# Patient Record
(demographics unavailable — no encounter records)

---

## 2019-01-24 NOTE — EDM.PDOC
ED HPI GENERAL MEDICAL PROBLEM





- General


Stated Complaint: PINK EYE


Time Seen by Provider: 01/24/19 11:20


Source of Information: Reports: Patient, Family


History Limitations: Reports: No Limitations





- History of Present Illness


INITIAL COMMENTS - FREE TEXT/NARRATIVE: 


PEDS HISTORY AND PHYSICAL:





History of present illness:


Patient is a 6-year-old male who is brought to the emergency room by his father 

with concerns of conjunctivitis to the right eye. He states that this morning 

he was fine but was asked to pick him up from school as the right eye had 

redness and some purulent drainage. Father states that the child has had a 

persistent cough  x 1 month and is requesting antibiotics. Denies any fever, 

chills, chest pain or shortness of breath. Denies any abdominal pain, nausea, 

vomiting, diarrhea or constipation. He has been eating and drinking 

appropriately. Childhood immunizations are up to date.





Review of systems: 


As per history of present illness and below otherwise all systems reviewed and 

negative.





Past medical history: 


As per history of present illness and as reviewed below otherwise 

noncontributory.





Surgical history: 


As per history of present illness and as reviewed below otherwise 

noncontributory.





Social history: 


No reported history of drug or alcohol abuse.





Family history: 


As per history of present illness and as reviewed below otherwise 

noncontributory.





Physical exam:


General: Well-developed and well-nourished 6-year-old -American male. 

Alert and appropriate for age. Nontoxic appearing and in no acute distress.


HEENT: Atraumatic, normocephalic, pupils reactive, negative for conjunctival 

pallor or scleral icterus, scleral injection of the right eye with watery 

discharge. His mucous membranes moist, throat clear, neck supple, nontender, 

trachea midline.  TMs normal bilaterally, no cervical adenopathy or nuchal 

rigidity.  


Lungs: Clear to auscultation, breath sounds equal bilaterally, chest nontender.


Heart: S1S2, regular rate and rhythm, no overt murmurs


Abdomen: Soft, nondistended, nontender. Negative for masses or 

hepatosplenomegaly. Normal abdominal bowel sounds.  


Pelvis: Stable nontender.


Genitourinary: Deferred.


Rectal: Deferred.


Extremities: Atraumatic, full range of motion without defects or deficits. 

Neurovascular unremarkable.


Neuro: Awake, alert, and age appropriate. Cranial nerves II through XII 

unremarkable. Cerebellum unremarkable. Motor and sensory unremarkable 

throughout. Exam nonfocal.


Skin:  Normal turgor, no overt rash or lesions





Notes:


Patient's lung sounds are clear. Dad is adamant that the antibiotics should be 

prescribed. I encouraged him to follow-up with a pediatrician for further 

evaluation and management of this cough. I will give him an albuterol inhaler 

for home. 





Diagnostics:


None





Therapeutics:


None





Prescription:


Erythromycin Ointment


Albuterol Inhaler





Impression: 


Conjunctivitis


URI





Plan:


1. Use the antibiotic eye drops as directed. Avoid touching the eye with the 

tip of the dropper as this can cause recurrent contamination. Make sure you're 

doing good handwashing. Use the inhaler as needed for cough.


2. Use a warm clean cloth to wipe away any crusties/drainage.


3. Please follow-up with your pediatrician and/or the ophthalmologist in the 

next 1-2 days. Return to the ED as needed and as discussed.





Definitive disposition and diagnosis as appropriate pending reevaluation and 

review of above.








Onset: Today





- Related Data


 Allergies











Allergy/AdvReac Type Severity Reaction Status Date / Time


 


No Known Allergies Allergy   Verified 01/24/19 11:21











Home Meds: 


 Home Meds





. [No Known Home Meds]  01/24/19 [History]











ED ROS GENERAL





- Review of Systems


Review Of Systems: ROS reveals no pertinent complaints other than HPI.





ED EXAM GENERAL W FULL EYE





- Physical Exam


Exam: See Below (See dictation)





Course





- Vital Signs


Last Recorded V/S: 


 Last Vital Signs











Temp  99.0 F   01/24/19 11:19


 


Pulse  119 H  01/24/19 11:19


 


Resp  20   01/24/19 11:19


 


BP      


 


Pulse Ox  96   01/24/19 11:19














Departure





- Departure


Time of Disposition: 11:28


Disposition: Home, Self-Care 01


Clinical Impression: 


Conjunctivitis


Qualifiers:


 Conjunctivitis type: acute Acute conjunctivitis type: bacterial Laterality: 

right Qualified Code(s): H10.31 - Unspecified acute conjunctivitis, right eye





URI (upper respiratory infection)


Qualifiers:


 URI type: unspecified viral URI Qualified Code(s): J06.9 - Acute upper 

respiratory infection, unspecified








- Discharge Information


Instructions:  Upper Respiratory Infection, Pediatric, Easy-to-Read, Bacterial 

Conjunctivitis, Pediatric


Referrals: 


PCP,None [Primary Care Provider] - 


Additional Instructions: 


The following information is given to patients seen in the emergency department 

who are being discharged to home. This information is to outline your options 

for follow-up care. We provide all patients seen in our emergency department 

with a follow-up referral.





The need for follow-up, as well as the timing and circumstances, are variable 

depending upon the specifics of your emergency department visit.





If you don't have a primary care physician on staff, we will provide you with a 

referral. We always advise you to contact your personal physician following an 

emergency department visit to inform them of the circumstance of the visit and 

for follow-up with them and/or the need for any referrals to a consulting 

specialist.





The emergency department will also refer you to a specialist when appropriate. 

This referral assures that you have the opportunity for follow-up care with a 

specialist. All of these measure are taken in an effort to provide you with 

optimal care, which includes your follow-up.





Under all circumstances we always encourage you to contact your private 

physician who remains a resource for coordinating your care. When calling for 

follow-up care, please make the office aware that this follow-up is from your 

recent emergency room visit. If for any reason you are refused follow-up, 

please contact the [facility] Emergency 

Department at [phone] and asked to speak to the emergency department 

charge nurse.





[facility]


Primary Care


[address] [place] [id number]


Phone: [phone]


Fax: [phone]





[place] [address]


Phone: [phone]


Fax: [phone]





1. Use the antibiotic eye drops as directed. Avoid touching the eye with the 

tip of the dropper as this can cause recurrent contamination. Make sure you're 

doing good handwashing. Use the inhaler as needed for cough


2. Use a warm clean cloth to wipe away any crusties/drainage.


3. Please follow-up with your pediatrician and/or the ophthalmologist in the 

next 1-2 days. Return to the ED as needed and as discussed.

## 2019-04-03 NOTE — EDM.PDOC
ED HPI GENERAL MEDICAL PROBLEM





- General


Chief Complaint: Respiratory Problem


Stated Complaint: SICK-COUGH


Time Seen by Provider: 04/03/19 10:22


Source of Information: Reports: Patient


History Limitations: Reports: No Limitations





- History of Present Illness


INITIAL COMMENTS - FREE TEXT/NARRATIVE: 


PEDS HISTORY AND PHYSICAL:





History of present illness:


Patient is a 7-year-old male who presents to the emergency room with his father 

with concerns of fevers, cough, sore throat and sinus congestion 9-10 

days.Patient denies any headache, change in vision, syncope or near syncope. 

Denies any chest pain, back pain, or shortness of breath. Denies any abdominal 

pain, nausea, vomiting, diarrhea, constipation or dysuria. Patient has been 

eating and drinking appropriately. Childhood immunizations up to date.





Review of systems: 


As per history of present illness and below otherwise all systems reviewed and 

negative.





Past medical history: 


As per history of present illness and as reviewed below otherwise 

noncontributory.





Surgical history: 


As per history of present illness and as reviewed below otherwise 

noncontributory.





Social history: 


No reported history of drug or alcohol abuse.





Family history: 


As per history of present illness and as reviewed below otherwise 

noncontributory.





Physical exam:


General: Well-developed and well-nourished 7-year-old -American male. 

Alert and oriented. Nontoxic appearing and in no acute distress.


HEENT: Atraumatic, normocephalic, pupils reactive, negative for conjunctival 

pallor or scleral icterus, mucous membranes moist, throat erythematous without 

exudate, neck supple, nontender, trachea midline.  TMs normal bilaterally, no 

cervical adenopathy or nuchal rigidity.  


Lungs: Clear to auscultation, breath sounds equal bilaterally, chest nontender.


Heart: S1S2, regular rate and rhythm, no overt murmurs


Abdomen: Soft, nondistended, nontender. Negative for masses or 

hepatosplenomegaly. Normal abdominal bowel sounds.  


Pelvis: Stable nontender.


Genitourinary: Deferred.


Rectal: Deferred.


Extremities: Atraumatic, full range of motion without defects or deficits. 

Neurovascular unremarkable.


Neuro: Awake, alert, and age appropriate. Cranial nerves II through XII 

unremarkable. Cerebellum unremarkable. Motor and sensory unremarkable 

throughout. Exam nonfocal.


Skin:  Normal turgor, no overt rash or lesions





Notes:


Patient is here with 2 other brothers who have the same symptoms and none of 

the siblings have improved with over-the-counter medications. Family did treat 

with amoxicillin. Supportive care measures were reviewed and discussed. He 

voices understanding and is agreeable to plan of care. Denies any further 

questions or concerns at this time.


 


Diagnostics:


None





Therapeutics:


None





Prescription:


Amoxicillin





Impression: 


Pharyngitis


Upper respiratory illness





Plan:


1. Take antibiotic as discussed. Please use Tylenol and/or Ibuprofen as needed 

for pain and fever management. 


2. Get plenty of Rest. Encourage fluids to prevent dehydration. 


3. Please follow up with your primary care provider. Return to the ED as needed 

as discussed. 





Definitive disposition and diagnosis as appropriate pending reevaluation and 

review of above.





Duration: Week(s):





- Related Data


 Allergies











Allergy/AdvReac Type Severity Reaction Status Date / Time


 


No Known Allergies Allergy   Verified 04/03/19 10:22











Home Meds: 


 Home Meds





. [No Known Home Meds]  01/24/19 [History]











Past Medical History





- Past Health History


Medical/Surgical History: Denies Medical/Surgical History





- Infectious Disease History


Infectious Disease History: Reports: None





Social & Family History





- Family History


Family Medical History: Noncontributory





- Tobacco Use


Second Hand Smoke Exposure: No





ED ROS GENERAL





- Review of Systems


Review Of Systems: ROS reveals no pertinent complaints other than HPI.





ED EXAM, GENERAL





- Physical Exam


Exam: See Below (See dictation)





Course





- Vital Signs


Last Recorded V/S: 





 Last Vital Signs











Temp  98.0 F   04/03/19 10:20


 


Pulse  75   04/03/19 10:20


 


Resp  20   04/03/19 10:20


 


BP      


 


Pulse Ox  97   04/03/19 10:20














Departure





- Departure


Time of Disposition: 10:48


Disposition: Home, Self-Care 01


Clinical Impression: 


Pharyngitis


Qualifiers:


 Pharyngitis/tonsillitis etiology: unspecified etiology Qualified Code(s): 

J02.9 - Acute pharyngitis, unspecified





Upper respiratory infection


Qualifiers:


 URI type: unspecified URI Qualified Code(s): J06.9 - Acute upper respiratory 

infection, unspecified








- Discharge Information


Referrals: 


PCP,Unknown [Primary Care Provider] - 


Additional Instructions: 


The following information is given to patients seen in the emergency department 

who are being discharged to home. This information is to outline your options 

for follow-up care. We provide all patients seen in our emergency department 

with a follow-up referral.





The need for follow-up, as well as the timing and circumstances, are variable 

depending upon the specifics of your emergency department visit.





If you don't have a primary care physician on staff, we will provide you with a 

referral. We always advise you to contact your personal physician following an 

emergency department visit to inform them of the circumstance of the visit and 

for follow-up with them and/or the need for any referrals to a consulting 

specialist.





The emergency department will also refer you to a specialist when appropriate. 

This referral assures that you have the opportunity for follow-up care with a 

specialist. All of these measure are taken in an effort to provide you with 

optimal care, which includes your follow-up.





Under all circumstances we always encourage you to contact your private 

physician who remains a resource for coordinating your care. When calling for 

follow-up care, please make the office aware that this follow-up is from your 

recent emergency room visit. If for any reason you are refused follow-up, 

please contact the [facility] Emergency 

Department at [phone] and asked to speak to the emergency department 

charge nurse.





[facility]


Primary Care


[address] [place] [id number]


Phone: [phone]


Fax: [phone]





[address] [address]


Phone: [phone]


Fax: [phone]





1. Take antibiotic as discussed. Please use Tylenol and/or Ibuprofen as needed 

for pain and fever management. 


2. Get plenty of Rest. Encourage fluids to prevent dehydration. 


3. Please follow up with your primary care provider. Return to the ED as needed 

as discussed.

## 2019-09-17 NOTE — EDM.PDOC
ED HPI GENERAL MEDICAL PROBLEM





- General


Chief Complaint: General


Stated Complaint: PT FELL AT HOME


Time Seen by Provider: 09/17/19 08:32


Source of Information: Reports: Family


History Limitations: Reports: No Limitations





- History of Present Illness


INITIAL COMMENTS - FREE TEXT/NARRATIVE: 


History of present illness:


[]Patient slipped on the floor last night around 10:00 and knocked his upper 

front tooth out and chipped the another. Patient denies any loss of 

consciousness, having any pain in his mouth he has no active bleeding and 

denies any other injuries. Mom states she kept the tooth in the refrigerator.





Review of systems: 


As per history of present illness and below otherwise all systems reviewed and 

negative.





Past medical history: 


As per history of present illness and as reviewed below otherwise 

noncontributory.





Surgical history: 


As per history of present illness and as reviewed below otherwise 

noncontributory.





Social history: 


No reported history of drug or alcohol abuse.





Family history: 


As per history of present illness and as reviewed below otherwise 

noncontributory.





Physical exam:


General: Well developed, well nourished in NAD


HEENT: Atraumatic, normocephalic, pupils reactive, negative for conjunctival 

pallor or scleral icterus, mucous membranes moist, throat clear, neck supple, 

nontender, trachea midline.


Lungs: Clear to auscultation, breath sounds equal bilaterally, chest nontender.


Heart: S1S2, regular, negative for clicks, rubs, or JVD.


Abdomen: NABS, Soft, nondistended, nontender. Negative for masses or 

hepatosplenomegaly. Negative for costovertebral tenderness.


Pelvis: Stable nontender.


Genitourinary: Deferred.


Rectal: Deferred.


Extremities: Atraumatic, negative for cords or calf pain. Neurovascular 

unremarkable.


Neuro: Awake, alert, oriented. Cranial nerves II through XII unremarkable. 

Cerebellum unremarkable. Motor and sensory unremarkable throughout. Exam 

nonfocal.


Skin:warm and dry





Diagnostics:


None





Therapeutics:


None





ED Course:


Stable





Impression: 


Tooth avulsion





Prescriptions:


None





Plan:


Follow-up with dentist





Definitive disposition and diagnosis as appropriate pending reevaluation and 

review of above.








- Related Data


 Allergies











Allergy/AdvReac Type Severity Reaction Status Date / Time


 


No Known Allergies Allergy   Verified 09/17/19 08:39











Home Meds: 


 Home Meds





. [No Known Home Meds]  01/24/19 [History]











Past Medical History





- Past Health History


Medical/Surgical History: Denies Medical/Surgical History


HEENT History: Reports: None


Cardiovascular History: Reports: None


Respiratory History: Reports: None


Gastrointestinal History: Reports: None


Genitourinary History: Reports: None


Musculoskeletal History: Reports: None


Neurological History: Reports: None


Psychiatric History: Reports: None


Endocrine/Metabolic History: Reports: None


Hematologic History: Reports: None


Immunologic History: Reports: None


Oncologic (Cancer) History: Reports: None


Dermatologic History: Reports: None





- Infectious Disease History


Infectious Disease History: Reports: None





- Past Surgical History


Head Surgeries/Procedures: Reports: None


HEENT Surgical History: Reports: None


Cardiovascular Surgical History: Reports: None


Respiratory Surgical History: Reports: None


GI Surgical History: Reports: None


Male  Surgical History: Reports: None


Endocrine Surgical History: Reports: None


Neurological Surgical History: Reports: None


Musculoskeletal Surgical History: Reports: None


Oncologic Surgical History: Reports: None


Dermatological Surgical History: Reports: None





Social & Family History





- Family History


Family Medical History: Noncontributory





- Tobacco Use


Smoking Status *Q: Never Smoker


Second Hand Smoke Exposure: No





- Caffeine Use


Caffeine Use: Reports: None





- Recreational Drug Use


Recreational Drug Use: No





ED ROS PEDIATRIC





- Review of Systems


Review Of Systems: See Below





ED EXAM, GENERAL (PEDS)





- Physical Exam


Exam: See Below





Course





- Vital Signs


Last Recorded V/S: 


 Last Vital Signs











Temp  97.0 F   09/17/19 08:35


 


Pulse  69 L  09/17/19 08:35


 


Resp  18   09/17/19 08:35


 


BP  115/66   09/17/19 08:35


 


Pulse Ox  98   09/17/19 08:35














Departure





- Departure


Time of Disposition: 08:47


Disposition: Home, Self-Care 01


Condition: Good


Clinical Impression: 


 Encounter for medical screening examination








- Discharge Information


*PRESCRIPTION DRUG MONITORING PROGRAM REVIEWED*: Not Applicable


*COPY OF PRESCRIPTION DRUG MONITORING REPORT IN PATIENT BAKARI: Not Applicable


Instructions:  Medical Screening Exam


Referrals: 


Margaux Shook MD [Primary Care Provider] - 


Forms:  ED Department Discharge


Additional Instructions: 


The following information is given to patients seen in the emergency department 

who are being discharged to home. This information is to outline your options 

for follow-up care. We provide all patients seen in our emergency department 

with a follow-up referral.





The need for follow-up, as well as the timing and circumstances, are variable 

depending upon the specifics of your emergency department visit.





If you don't have a primary care physician on staff, we will provide you with a 

referral. We always advise you to contact your personal physician following an 

emergency department visit to inform them of the circumstance of the visit and 

for follow-up with them and/or the need for any referrals to a consulting 

specialist.





The emergency department will also refer you to a specialist when appropriate. 

This referral assures that you have the opportunity for follow-up care with a 

specialist. All of these measure are taken in an effort to provide you with 

optimal care, which includes your follow-up.





Under all circumstances we always encourage you to contact your private 

physician who remains a resource for coordinating your care. When calling for 

follow-up care, please make the office aware that this follow-up is from your 

recent emergency room visit. If for any reason you are refused follow-up, 

please contact the [facility] Emergency 

Department at [phone] and asked to speak to the emergency department 

charge nurse.





Take meds as directed, follow up with your primary care physician, return to ER 

if symptoms worsen or change.





[facility]


Primary Care


[address] [place] [id number]


Phone: [phone]


Fax: [phone]

## 2020-02-05 NOTE — EDM.PDOC
ED HPI GENERAL MEDICAL PROBLEM





- General


Chief Complaint: Respiratory Problem


Stated Complaint: COUGH


Time Seen by Provider: 02/05/20 13:34


Source of Information: Reports: Patient, Family


History Limitations: Reports: No Limitations





- History of Present Illness


INITIAL COMMENTS - FREE TEXT/NARRATIVE: 


PEDS HISTORY AND PHYSICAL:





History of present illness:


Patient is a 7-year-old male who presents to the ED today with his mother for 

concern of cough over the past 2 to 3 days.  Mother and patient state other 

than the cough patient has been per his usual self and has no other symptoms or 

concerns.  Mother denies any health history for patient.





Patient denies fever, chills, chest pain, shortness of breath. Denies headache, 

neck stiff ness, change in vision, syncope, or near syncope. Denies nausea, 

vomiting, abdominal pain, diarrhea, constipation, or dysuria. Has not noted any 

blood in urine or stool. Patient has been eating and drinking appropriately.





Review of systems: 


As per history of present illness and below otherwise all systems reviewed and 

negative.





Past medical history: 


As per history of present illness and as reviewed below otherwise 

noncontributory.





Surgical history: 


As per history of present illness and as reviewed below otherwise 

noncontributory.





Social history: 


No reported history of drug or alcohol abuse.





Family history: 


As per history of present illness and as reviewed below otherwise 

noncontributory.





Physical exam:


General: Patient is alert, oriented, and in no acute distress.  Nontoxic 

nonfocal.  Patient sitting comfortably on exam table.


HEENT: Atraumatic, normocephalic, pupils reactive, negative for conjunctival 

pallor or scleral icterus, mucous membranes moist, throat clear, neck supple, 

nontender, trachea midline. TMs normal bilaterally, no cervical adenopathy or 

nuchal rigidity. 


Lungs: Clear to auscultation, breath sounds equal bilaterally, chest nontender.


Heart: S1S2, regular rate and rhythm, no overt murmurs


Abdomen: Soft, nondistended, nontender. Negative for masses or 

hepatosplenomegaly. Normal abdominal bowel sounds. 


Pelvis: Stable nontender.


Genitourinary: Deferred.


Rectal: Deferred.


Extremities: Atraumatic, full range of motion without defects or deficits. 

Neurovascular unremarkable.


Neuro: Awake, alert, and age appropriate. Cranial nerves II through XII 

unremarkable. Cerebellum unremarkable. Motor and sensory unremarkable 

throughout. Exam nonfocal.


Skin: Normal turgor, no overt rash or lesions





Notes:


Discussed importance for follow-up with a primary care provider or 

pediatrician.  Voices understanding and is agreeable to plan of care. Denies 

any further questions or concerns at this time.





Diagnostics:


Influenza





Therapeutics:


None





Prescription:


None





Impression: 


Cough





Plan:


1. Use cough drops for cough discomfort as discussed. Drink small but frequent 

sips of fluid to prevent dehydration.


2. Alternate Ibuprofen and Tylenol as directed for pain and discomfort.


3. Follow up with your pediatrician or primary care provider as discussed.


4. Return to the ED as needed and as discussed.








Definitive disposition and diagnosis as appropriate pending reevaluation and 

review of above.








- Related Data


 Allergies











Allergy/AdvReac Type Severity Reaction Status Date / Time


 


No Known Allergies Allergy   Verified 02/05/20 13:30











Home Meds: 


 Home Meds





. [No Known Home Meds]  01/24/19 [History]











Past Medical History





- Past Health History


Medical/Surgical History: Denies Medical/Surgical History


HEENT History: Reports: None


Cardiovascular History: Reports: None


Respiratory History: Reports: None


Gastrointestinal History: Reports: None


Genitourinary History: Reports: None


Musculoskeletal History: Reports: None


Neurological History: Reports: None


Psychiatric History: Reports: None


Endocrine/Metabolic History: Reports: None


Hematologic History: Reports: None


Immunologic History: Reports: None


Oncologic (Cancer) History: Reports: None


Dermatologic History: Reports: None





- Infectious Disease History


Infectious Disease History: Reports: None





- Past Surgical History


Head Surgeries/Procedures: Reports: None


HEENT Surgical History: Reports: None


Cardiovascular Surgical History: Reports: None


Respiratory Surgical History: Reports: None


GI Surgical History: Reports: None


Male  Surgical History: Reports: None


Endocrine Surgical History: Reports: None


Neurological Surgical History: Reports: None


Musculoskeletal Surgical History: Reports: None


Oncologic Surgical History: Reports: None


Dermatological Surgical History: Reports: None





Social & Family History





- Family History


Family Medical History: Noncontributory





- Tobacco Use


Second Hand Smoke Exposure: No





- Caffeine Use


Caffeine Use: Reports: None





ED ROS GENERAL





- Review of Systems


Review Of Systems: Comprehensive ROS is negative, except as noted in HPI.





ED EXAM, GENERAL





- Physical Exam


Exam: See Below (see dictation)





Course





- Vital Signs


Last Recorded V/S: 


 Last Vital Signs











Temp  98.6 F   02/05/20 13:30


 


Pulse  90   02/05/20 13:30


 


Resp  22   02/05/20 13:30


 


BP      


 


Pulse Ox  97   02/05/20 13:30














Departure





- Departure


Time of Disposition: 14:27


Disposition: Home, Self-Care 01


Clinical Impression: 


 Cough








- Discharge Information


Referrals: 


Margaux Shook MD [Primary Care Provider] - 


Forms:  ED Department Discharge


Additional Instructions: 


The following information is given to patients seen in the emergency department 

who are being discharged to home. This information is to outline your options 

for follow-up care. We provide all patients seen in our emergency department 

with a follow-up referral.





The need for follow-up, as well as the timing and circumstances, are variable 

depending upon the specifics of your emergency department visit.





If you don't have a primary care physician on staff, we will provide you with a 

referral. We always advise you to contact your personal physician following an 

emergency department visit to inform them of the circumstance of the visit and 

for follow-up with them and/or the need for any referrals to a consulting 

specialist.





The emergency department will also refer you to a specialist when appropriate. 

This referral assures that you have the opportunity for follow-up care with a 

specialist. All of these measure are taken in an effort to provide you with 

optimal care, which includes your follow-up.





Under all circumstances we always encourage you to contact your private 

physician who remains a resource for coordinating your care. When calling for 

follow-up care, please make the office aware that this follow-up is from your 

recent emergency room visit. If for any reason you are refused follow-up, 

please contact the [facility] Emergency 

Department at [phone] and asked to speak to the emergency department 

charge nurse.





[facility]


Primary Care


[address] [place] [id number]


Phone: [phone]


Fax: [phone]





[address] [address]


Phone: [phone]


Fax: [phone]





1. Use cough drops for cough discomfort as discussed. Drink small but frequent 

sips of fluid to prevent dehydration.


2. Alternate Ibuprofen and Tylenol as directed for pain and discomfort.


3. Follow up with your pediatrician or primary care provider as discussed.


4. Return to the ED as needed and as discussed.





 











Sepsis Event Note





- Focused Exam


Vital Signs: 


 Vital Signs











  Temp Pulse Resp Pulse Ox


 


 02/05/20 13:30  98.6 F  90  22  97











Date Exam was Performed: 02/05/20


Time Exam was Performed: 14:26